# Patient Record
(demographics unavailable — no encounter records)

---

## 2025-05-29 NOTE — PLAN
[FreeTextEntry1] : EMB done in office today. Plan is Pelvic MRI to locate IUD placed 2023. Start aygestin for 5-6 weeks. Plan LESS LSH for menorrhagia Need medical clearance from PCP ERICA Recinos am scribing for the presence of Dr. Scar Marquez the following sections HISTORY OF PRESENT ILLNESS, PAST MEDICAL/FAMILY/SOCIAL HISTORY; REVIEW OF SYSTEMS; VITAL SIGNS; PHYSICAL EXAM; DISPOSITION. I personally performed the services described in the documentation, reviewed the documentation recorded by the scribe in my presence and it accurately and completely records my words and actions.

## 2025-05-29 NOTE — PHYSICAL EXAM
[Labia Majora] : normal [Labia Minora] : normal [Normal] : normal [Uterine Adnexae] : normal [FreeTextEntry2] : Kerrie Martines [FreeTextEntry6] : 16 week size uterus

## 2025-05-29 NOTE — HISTORY OF PRESENT ILLNESS
[FreeTextEntry1] : 46 year old female  PMHx HTN, DM2 (diet controlled), fibroids, ISABEL, depression, obesity Mirena IUD in place 10/2023 presents today for TVS follow up to evaluate fibroid and IUD. Patient is reports since IUD has been placed she's experiencing continuous vaginal bleeding. She reports fatigue.   TVUS- unable to locate IUD on imaging today.

## 2025-07-03 NOTE — HISTORY OF PRESENT ILLNESS
[No Pertinent Cardiac History] : no history of aortic stenosis, atrial fibrillation, coronary artery disease, recent myocardial infarction, or implantable device/pacemaker [No Pertinent Pulmonary History] : no history of asthma, COPD, sleep apnea, or smoking [No Adverse Anesthesia Reaction] : no adverse anesthesia reaction in self or family member [Chronic Anticoagulation] : no chronic anticoagulation [Chronic Kidney Disease] : no chronic kidney disease [Diabetes] : diabetes [(Patient denies any chest pain, claudication, dyspnea on exertion, orthopnea, palpitations or syncope)] : Patient denies any chest pain, claudication, dyspnea on exertion, orthopnea, palpitations or syncope [Moderate (4-6 METs)] : Moderate (4-6 METs) [FreeTextEntry1] : Partial Hysterectomy [FreeTextEntry2] : 07/10/25 @ [facility]  [FreeTextEntry3] : Dr. Marquez  [FreeTextEntry4] : 47 yo F PMH iron def anemia, DM2, obesity, fibroids presents for medical clearance  PST completed

## 2025-07-15 NOTE — PLAN
[FreeTextEntry1] :  PLAN:  LESS LSH for menorrhagia Zpack and oxycodone #6 Discussed pre op and post op instructions in detail. Vaginal restrictions post op only all of patients questions regarding procedure were answered. consent signed by MD, patient and witness. she is to f/u with me 2 weeks post operatively. she is agreeable with plan.  I Kerrie HERNANDEZ am scribing for the presence of Dr. Marquez the following sections HISTORY OF PRESENT ILLNESS, PAST MEDICAL/FAMILY/SOCIAL HISTORY; REVIEW OF SYSTEMS; VITAL SIGNS; PHYSICAL EXAM; DISPOSITION. I personally performed the services described in the documentation, reviewed the documentation recorded by the scribe in my presence and it accurately and completely records my words and actions.

## 2025-07-15 NOTE — PLAN
[FreeTextEntry1] :  PLAN:  LESS LSH for menorrhagia Zpack and oxycodone #6 Discussed pre op and post op instructions in detail. Vaginal restrictions post op only all of patients questions regarding procedure were answered. consent signed by MD, patient and witness. she is to f/u with me 2 weeks post operatively. she is agreeable with plan.  I eKrrie HERNANDEZ am scribing for the presence of Dr. Marquez the following sections HISTORY OF PRESENT ILLNESS, PAST MEDICAL/FAMILY/SOCIAL HISTORY; REVIEW OF SYSTEMS; VITAL SIGNS; PHYSICAL EXAM; DISPOSITION. I personally performed the services described in the documentation, reviewed the documentation recorded by the scribe in my presence and it accurately and completely records my words and actions.

## 2025-07-15 NOTE — HISTORY OF PRESENT ILLNESS
[FreeTextEntry1] : 46 year old female presents here for final decision for surgery. she is scheduled for LESS LSH for menorrhagia.

## 2025-07-27 NOTE — ASSESSMENT
[Doing Well] : is doing well [No Sign of Infection] : is showing no signs of infection cold symptoms

## 2025-07-28 NOTE — HISTORY OF PRESENT ILLNESS
[de-identified] : 46 year old female presents today for post op examination she is s/p LESS LSH BS for menorrhagia

## 2025-07-28 NOTE — HISTORY OF PRESENT ILLNESS
[de-identified] : 46 year old female presents today for post op examination she is s/p LESS LSH BS for menorrhagia

## 2025-07-28 NOTE — PLAN
[FreeTextEntry1] : path and surgical photos reviewed patient to call me if she has any increased pain over the next few weeks follow up 4 weeks for final post op visit all of her questions were answered she is agreeable with plan      I Kerrie HERNANDEZ am scribing for the presence of Dr. Scar Marquez the following sections HISTORY OF PRESENT ILLNESS, PAST MEDICAL/FAMILY/SOCIAL HISTORY; REVIEW OF SYSTEMS; VITAL SIGNS; PHYSICAL EXAM; DISPOSITION. I personally performed the services described in the documentation, reviewed the documentation recorded by the scribe in my presence and it accurately and completely records my words and actions.

## 2025-07-28 NOTE — HISTORY OF PRESENT ILLNESS
[de-identified] : 46 year old female presents today for post op examination she is s/p LESS LSH BS for menorrhagia

## 2025-07-28 NOTE — HISTORY OF PRESENT ILLNESS
[de-identified] : 46 year old female presents today for post op examination she is s/p LESS LSH BS for menorrhagia